# Patient Record
Sex: FEMALE | Race: WHITE | NOT HISPANIC OR LATINO | URBAN - METROPOLITAN AREA
[De-identification: names, ages, dates, MRNs, and addresses within clinical notes are randomized per-mention and may not be internally consistent; named-entity substitution may affect disease eponyms.]

---

## 2024-07-08 NOTE — H&P (VIEW-ONLY)
[facility]-[address] [address]       Name: Hallie Nix  MRN:  [id number]       Date: 2024  :    1935  Age: 89 y.o.         Reason for Visit: Test Results    History of Present Illness  Hallie Nix is a 89 y.o. female who is being seen today for follow up with CT abdomen.  Having pain and burning LUQ.  When she urinates, sometimes a little stool will come out.  Normal brown colored stool.  Pain in abdomen does  not worsen with food intake.  Stomach hurts the most in the evening.  Has been taking the Protonix and it has helped.  BP well controlled.  Taking Celexa for anxiety.    Past Medical History:   Diagnosis Date   • Anxiety    • Asthma    • HTN (hypertension)    • Hyperlipidemia    • Hypothyroid          Past Surgical History:   Procedure Laterality Date   • NO PAST SURGERIES           Current Outpatient Medications   Medication Sig   • albuterol sulfate (PROVENTIL OR VENTOLIN OR PROAIR) 90 mcg/actuation Inhalation oral inhaler Take 1-2 Puffs by inhalation Every 6 hours as needed   • amLODIPine (NORVASC) 5 mg Oral Tablet Take 1 Tablet (5 mg total) by mouth Once a day for 200 days   • atorvastatin (LIPITOR) 20 mg Oral Tablet Take 1 Tablet (20 mg total) by mouth Once a day for 200 days   • BREO ELLIPTA 200-25 mcg/dose Inhalation Disk with Device Take 1 Inhalation by inhalation Once a day for 90 days   • citalopram (CELEXA) 10 mg Oral Tablet Take 1 Tablet (10 mg total) by mouth Once a day for 90 days   • levothyroxine (SYNTHROID) 88 mcg Oral Tablet Take 1 Tablet (88 mcg total) by mouth Every morning for 200 days   • pantoprazole (PROTONIX) 40 mg Oral Tablet, Delayed Release (E.C.) Take 1 Tablet (40 mg total) by mouth Once a day for 30 days     No Known Allergies  Family Medical History:    None         Social History     Tobacco Use   • Smoking status: Never   • Smokeless tobacco: Never   Substance Use Topics   • Alcohol use: No   • Drug use: No        Nursing note  There are no exam notes on file for this visit.     Review of Systems  Review of Systems   Gastrointestinal:  Positive for abdominal pain (LUQ).   All other systems reviewed and are negative.       Physical Exam:  /72   Pulse 74   Temp 36.5 °C (97.7 °F)   Ht 1.524 m (5')   Wt 69.9 kg (154 lb)   SpO2 96%   BMI 30.08 kg/m²       Physical Exam  Vitals reviewed.   Constitutional:       Appearance: Normal appearance.   HENT:      Head: Normocephalic and atraumatic.   Cardiovascular:      Rate and Rhythm: Normal rate and regular rhythm.      Pulses: Normal pulses.      Heart sounds: Normal heart sounds.   Pulmonary:      Effort: Pulmonary effort is normal.      Breath sounds: Normal breath sounds.   Abdominal:      General: Bowel sounds are normal.      Palpations: Abdomen is soft.   Musculoskeletal:         General: Normal range of motion.      Cervical back: Normal range of motion and neck supple.   Skin:     General: Skin is warm and dry.      Capillary Refill: Capillary refill takes 2 to 3 seconds.   Neurological:      Mental Status: She is alert. Mental status is at baseline.          Assessment and Plan      ICD-10-CM    1. Abnormal CT of the abdomen  R93.5       2. Chronic upper abdominal pain  R10.10     G89.29       3. Essential hypertension  I10       4. Anxiety  F41.9              Refill Protonix  Referral to GI    Follow up: Return in about 3 months (around 10/8/2024).    INDIGO Bailey  7/8/2024, 11:55

## 2024-07-09 ENCOUNTER — TELEPHONE (OUTPATIENT)
Dept: GASTROENTEROLOGY | Facility: CLINIC | Age: 89
End: 2024-07-09

## 2024-07-09 NOTE — TELEPHONE ENCOUNTER
CT abdomen done in [place] on 7/5- located in Care Everywhere section of chart. No need for release.     Patient aware.

## 2024-07-09 NOTE — TELEPHONE ENCOUNTER
----- Message from Anu Concepcion MA sent at 7/9/2024  3:15 PM EDT -----  This is a new pt coming to see Bobby on 7/18 her Daughter called and said they needed Mri results faxed to us before hand but that the place it was completed at requested our office reach out to them first. Can I call and request the records? Does Bobby have to? Or does she have to do a medical release form first. It was done at a non  Facility in [facility]. The number is [phone].

## 2024-07-18 ENCOUNTER — APPOINTMENT (OUTPATIENT)
Dept: GASTROENTEROLOGY | Facility: CLINIC | Age: 89
End: 2024-07-18
Payer: MEDICARE

## 2024-07-18 VITALS
HEART RATE: 76 BPM | OXYGEN SATURATION: 94 % | BODY MASS INDEX: 29.38 KG/M2 | SYSTOLIC BLOOD PRESSURE: 171 MMHG | DIASTOLIC BLOOD PRESSURE: 87 MMHG | WEIGHT: 155.6 LBS

## 2024-07-18 DIAGNOSIS — R10.10 PAIN OF UPPER ABDOMEN: ICD-10-CM

## 2024-07-18 DIAGNOSIS — R93.3 ABNORMAL FINDING ON GI TRACT IMAGING: Primary | ICD-10-CM

## 2024-07-18 PROCEDURE — 1036F TOBACCO NON-USER: CPT | Performed by: INTERNAL MEDICINE

## 2024-07-18 PROCEDURE — 99203 OFFICE O/P NEW LOW 30 MIN: CPT | Performed by: INTERNAL MEDICINE

## 2024-07-18 PROCEDURE — 1159F MED LIST DOCD IN RCRD: CPT | Performed by: INTERNAL MEDICINE

## 2024-07-18 RX ORDER — AMLODIPINE BESYLATE 5 MG/1
5 TABLET ORAL
COMMUNITY
Start: 2024-04-18 | End: 2024-11-04

## 2024-07-18 RX ORDER — ALBUTEROL SULFATE 90 UG/1
1-2 AEROSOL, METERED RESPIRATORY (INHALATION)
COMMUNITY

## 2024-07-18 RX ORDER — LEVOTHYROXINE SODIUM 88 UG/1
88 TABLET ORAL
COMMUNITY
Start: 2024-04-18 | End: 2024-11-04

## 2024-07-18 RX ORDER — ATORVASTATIN CALCIUM 20 MG/1
20 TABLET, FILM COATED ORAL
COMMUNITY
Start: 2024-07-02 | End: 2025-01-18

## 2024-07-18 RX ORDER — PANTOPRAZOLE SODIUM 40 MG/1
40 TABLET, DELAYED RELEASE ORAL
COMMUNITY
Start: 2024-07-08 | End: 2024-08-07

## 2024-07-18 RX ORDER — FLUTICASONE FUROATE AND VILANTEROL TRIFENATATE 200; 25 UG/1; UG/1
1 POWDER RESPIRATORY (INHALATION) DAILY
COMMUNITY
Start: 2024-07-01

## 2024-07-18 ASSESSMENT — ENCOUNTER SYMPTOMS: ABDOMINAL PAIN: 1

## 2024-07-18 NOTE — PROGRESS NOTES
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  HPI  89-year-old female with history of hypertension, hyperlipidemia, hypothyroidism, anxiety, asthma came to GI clinic as a referral for further evaluation due to abnormal findings on CT scan.  She report upper abdominal pain over the 3 to 4 months-intermittent, mild in severity, no particular aggravating/elevating factor.  She lost lately 4 pounds intentionally.  She had episode of constipation which subsequently resolved   Denies nausea vomiting heartburn diarrhea melena/hematochezia  No previous EGD.  No family history of GI cancer.  Labs shows mild impairment of renal function probably due to dehydration checked on 7/5/2024.  Borderline total bilirubin.  Normal hemoglobin  CT scan on 7/5/2024 at [facility] -Abnormal wall thickening involving the upper stomach.  Small hiatal hernia   Review of Systems   Gastrointestinal:  Positive for abdominal pain.     12 Point ROS negative outside of symptoms stated above in HPI    History reviewed. No pertinent past medical history.    History reviewed. No pertinent surgical history.    No relevant family history has been documented for this patient.     reports that she has never smoked. She has never used smokeless tobacco. She reports that she does not drink alcohol and does not use drugs.    No Known Allergies    Imaging  CT abdomen w IV contrast    Result Date: 7/6/2024  PASTOR NIX RADIOLOGIST: Bereket Quiles MD CT ABDOMEN W IV CONTRAST performed on 7/5/2024 5:53 PM CLINICAL HISTORY: R10.10: Chronic upper abdominal pain G89.29: Chronic upper abdominal pain. Chronic upper abdominal pain, worst on left with nausea TECHNIQUE:  Abdomen CT with intravenous contrast. IV CONTRAST: 75 ml of Isovue 300 COMPARISON:  None. FINDINGS: Lung bases: Mild dependent atelectasis Liver:   4 small benign-appearing hypodensities likely cysts  Biliary:   Gallbladder is unremarkable No bile duct dilation Spleen:   Unremarkable. Pancreas:   Unremarkable. Adrenals:   Unremarkable. Kidneys:   No acute findings Mild renal parenchymal volume loss <1 cm simple cyst left kidney No obstruction No stones Bowel:   Small hiatal hernia Asymmetric wall thickening upper stomach No gastric outlet obstruction Imaged loops of small and large bowel are unremarkable Lymph nodes:  No adenopathy Vasculature:   Unremarkable Peritoneum / Retroperitoneum: Unremarkable Bones:   No acute findings Moderately advanced degenerative changes lumbar spine    Apparent abnormal wall thickening involving the upper stomach. This is potentially malignant. Recommend GI consultation and follow-up endoscopy if not performed in the recent past Small hiatal hernia One or more dose reduction techniques were used (e.g., Automated exposure control, adjustment of the mA and/or kV according to patient size, use of iterative reconstruction technique). A Non Emergent Yellow actionable finding has been sent via the [employer] Findings application on 7/6/2024 7:42 AM, Message ID [id number]. Receipt of this communication will be communicated to [place] [facility] or responsible provider and will be documented in [employer] System upon receiving the acknowledgement. Radiologist location ID: [id number]        Laboratory  No results found for this or any previous visit (from the past 96 hour(s)).     Objective       Current Outpatient Medications:     albuterol 90 mcg/actuation inhaler, Inhale 1-2 puffs., Disp: , Rfl:     amLODIPine (Norvasc) 5 mg tablet, Take 1 tablet (5 mg) by mouth., Disp: , Rfl:     atorvastatin (Lipitor) 20 mg tablet, Take 1 tablet (20 mg) by mouth., Disp: , Rfl:     Breo Ellipta 200-25 mcg/dose inhaler, Take 1 puff by mouth once daily., Disp: , Rfl:     levothyroxine (Synthroid, Levoxyl) 88 mcg tablet, Take 1 tablet (88 mcg) by mouth., Disp: , Rfl:      pantoprazole (ProtoNix) 40 mg EC tablet, Take 1 tablet (40 mg) by mouth., Disp: , Rfl:     Last Recorded Vitals  Blood pressure 171/87, pulse 76, weight 70.6 kg (155 lb 9.6 oz), SpO2 94%.    Physical Exam  HENT:      Mouth/Throat:      Mouth: Mucous membranes are moist.   Eyes:      General: No scleral icterus.  Cardiovascular:      Rate and Rhythm: Normal rate and regular rhythm.   Pulmonary:      Effort: Pulmonary effort is normal. No respiratory distress.      Breath sounds: Normal breath sounds.   Abdominal:      General: Abdomen is flat. Bowel sounds are normal.      Palpations: Abdomen is soft.   Musculoskeletal:      Right lower leg: Edema present.      Left lower leg: Edema present.      Comments: Trace   Neurological:      Mental Status: She is alert and oriented to person, place, and time.         Assessment/Plan    Upper abdominal pain  Abnormal findings on CT scan-rule out gastric malignancy      EGD with biopsy as soon as possible.  Continue follow-up with PMD  Follow-up with GI as needed

## 2024-07-21 ENCOUNTER — ANESTHESIA EVENT (OUTPATIENT)
Dept: OPERATING ROOM | Facility: HOSPITAL | Age: 89
End: 2024-07-21
Payer: MEDICARE

## 2024-07-21 RX ORDER — ONDANSETRON HYDROCHLORIDE 2 MG/ML
8 INJECTION, SOLUTION INTRAVENOUS ONCE
Status: CANCELLED | OUTPATIENT
Start: 2024-07-21 | End: 2024-07-21

## 2024-07-21 RX ORDER — ACETAMINOPHEN 325 MG/1
650 TABLET ORAL EVERY 4 HOURS PRN
Status: CANCELLED | OUTPATIENT
Start: 2024-07-21

## 2024-07-21 RX ORDER — ALBUTEROL SULFATE 0.83 MG/ML
2.5 SOLUTION RESPIRATORY (INHALATION) ONCE AS NEEDED
Status: CANCELLED | OUTPATIENT
Start: 2024-07-21

## 2024-07-22 ENCOUNTER — ANESTHESIA (OUTPATIENT)
Dept: OPERATING ROOM | Facility: HOSPITAL | Age: 89
End: 2024-07-22
Payer: MEDICARE

## 2024-07-22 ENCOUNTER — HOSPITAL ENCOUNTER (OUTPATIENT)
Dept: OPERATING ROOM | Facility: HOSPITAL | Age: 89
Setting detail: OUTPATIENT SURGERY
Discharge: HOME | End: 2024-07-22
Payer: MEDICARE

## 2024-07-22 VITALS
HEART RATE: 80 BPM | RESPIRATION RATE: 16 BRPM | TEMPERATURE: 96.8 F | OXYGEN SATURATION: 98 % | BODY MASS INDEX: 30.6 KG/M2 | SYSTOLIC BLOOD PRESSURE: 155 MMHG | WEIGHT: 155.87 LBS | DIASTOLIC BLOOD PRESSURE: 70 MMHG | HEIGHT: 60 IN

## 2024-07-22 DIAGNOSIS — R93.3 ABNORMAL FINDING ON GI TRACT IMAGING: Primary | ICD-10-CM

## 2024-07-22 DIAGNOSIS — I85.00 VARICES OF ESOPHAGUS DETERMINED BY ENDOSCOPY (MULTI): ICD-10-CM

## 2024-07-22 DIAGNOSIS — R93.3 ABNORMAL FINDING ON GI TRACT IMAGING: ICD-10-CM

## 2024-07-22 PROCEDURE — 7100000009 HC PHASE TWO TIME - INITIAL BASE CHARGE: Performed by: ANESTHESIOLOGY

## 2024-07-22 PROCEDURE — 7100000010 HC PHASE TWO TIME - EACH INCREMENTAL 1 MINUTE: Performed by: ANESTHESIOLOGY

## 2024-07-22 PROCEDURE — 2500000005 HC RX 250 GENERAL PHARMACY W/O HCPCS: Performed by: NURSE ANESTHETIST, CERTIFIED REGISTERED

## 2024-07-22 PROCEDURE — 3700000001 HC GENERAL ANESTHESIA TIME - INITIAL BASE CHARGE: Performed by: ANESTHESIOLOGY

## 2024-07-22 PROCEDURE — 3600000007 HC OR TIME - EACH INCREMENTAL 1 MINUTE - PROCEDURE LEVEL TWO: Performed by: ANESTHESIOLOGY

## 2024-07-22 PROCEDURE — 3700000002 HC GENERAL ANESTHESIA TIME - EACH INCREMENTAL 1 MINUTE: Performed by: ANESTHESIOLOGY

## 2024-07-22 PROCEDURE — A43235 PR ESOPHAGOGASTRODUODENOSCOPY TRANSORAL DIAGNOSTIC: Performed by: ANESTHESIOLOGY

## 2024-07-22 PROCEDURE — 99100 ANES PT EXTEME AGE<1 YR&>70: CPT | Performed by: ANESTHESIOLOGY

## 2024-07-22 PROCEDURE — 3600000002 HC OR TIME - INITIAL BASE CHARGE - PROCEDURE LEVEL TWO: Performed by: ANESTHESIOLOGY

## 2024-07-22 PROCEDURE — 2500000004 HC RX 250 GENERAL PHARMACY W/ HCPCS (ALT 636 FOR OP/ED): Performed by: ANESTHESIOLOGY

## 2024-07-22 PROCEDURE — A43235 PR ESOPHAGOGASTRODUODENOSCOPY TRANSORAL DIAGNOSTIC: Performed by: NURSE ANESTHETIST, CERTIFIED REGISTERED

## 2024-07-22 PROCEDURE — 2500000004 HC RX 250 GENERAL PHARMACY W/ HCPCS (ALT 636 FOR OP/ED): Performed by: NURSE ANESTHETIST, CERTIFIED REGISTERED

## 2024-07-22 PROCEDURE — 43239 EGD BIOPSY SINGLE/MULTIPLE: CPT | Performed by: INTERNAL MEDICINE

## 2024-07-22 RX ORDER — SODIUM CHLORIDE, SODIUM LACTATE, POTASSIUM CHLORIDE, CALCIUM CHLORIDE 600; 310; 30; 20 MG/100ML; MG/100ML; MG/100ML; MG/100ML
100 INJECTION, SOLUTION INTRAVENOUS CONTINUOUS
Status: DISCONTINUED | OUTPATIENT
Start: 2024-07-22 | End: 2024-07-23 | Stop reason: HOSPADM

## 2024-07-22 RX ORDER — LIDOCAINE HCL/PF 100 MG/5ML
SYRINGE (ML) INTRAVENOUS AS NEEDED
Status: DISCONTINUED | OUTPATIENT
Start: 2024-07-22 | End: 2024-07-22

## 2024-07-22 RX ORDER — PROPOFOL 10 MG/ML
INJECTION, EMULSION INTRAVENOUS AS NEEDED
Status: DISCONTINUED | OUTPATIENT
Start: 2024-07-22 | End: 2024-07-22

## 2024-07-22 RX ORDER — METOPROLOL TARTRATE 1 MG/ML
INJECTION, SOLUTION INTRAVENOUS AS NEEDED
Status: DISCONTINUED | OUTPATIENT
Start: 2024-07-22 | End: 2024-07-22

## 2024-07-22 ASSESSMENT — PAIN SCALES - GENERAL
PAINLEVEL_OUTOF10: 0 - NO PAIN
PAIN_LEVEL: 0
PAINLEVEL_OUTOF10: 0 - NO PAIN
PAINLEVEL_OUTOF10: 0 - NO PAIN

## 2024-07-22 ASSESSMENT — COLUMBIA-SUICIDE SEVERITY RATING SCALE - C-SSRS
2. HAVE YOU ACTUALLY HAD ANY THOUGHTS OF KILLING YOURSELF?: NO
1. IN THE PAST MONTH, HAVE YOU WISHED YOU WERE DEAD OR WISHED YOU COULD GO TO SLEEP AND NOT WAKE UP?: NO
6. HAVE YOU EVER DONE ANYTHING, STARTED TO DO ANYTHING, OR PREPARED TO DO ANYTHING TO END YOUR LIFE?: NO

## 2024-07-22 ASSESSMENT — PAIN - FUNCTIONAL ASSESSMENT
PAIN_FUNCTIONAL_ASSESSMENT: 0-10
PAIN_FUNCTIONAL_ASSESSMENT: 0-10

## 2024-07-22 NOTE — ANESTHESIA PREPROCEDURE EVALUATION
"Patient: Hallie Nix    Procedure Information       Date/Time: 07/22/24 1040    Scheduled providers: Antonio Rosales MD; Arturo Akhtar RN; Karley Abarca RN; Zaire Stewart MD    Procedure: EGD    Location: [facility] OR     89-year-old female with history of hypertension, hyperlipidemia, hypothyroidism, anxiety, asthma came to GI clinic as a referral for further evaluation due to abnormal findings on CT scan.     ALLERGIES:  No Known Allergies     MEDICAL HISTORY:  Past Medical History:   Diagnosis Date    Abdominal pain     Abnormal finding on GI tract imaging 07/05/2024    Anxiety     Asthma ([facility])     HLD (hyperlipidemia)     HTN (hypertension)     Hypothyroid     Major depressive disorder, single episode, unspecified 05/30/2021        Relevant Problems   No relevant active problems        SURGICAL HISTORY:  History reviewed. No pertinent surgical history.     MEDS:  Current Outpatient Medications   Medication Instructions    albuterol 90 mcg/actuation inhaler 1-2 puffs, inhalation    amLODIPine (NORVASC) 5 mg, oral    atorvastatin (LIPITOR) 20 mg, oral    Breo Ellipta 200-25 mcg/dose inhaler 1 puff, oral, Daily    levothyroxine (SYNTHROID, LEVOXYL) 88 mcg, oral    pantoprazole (PROTONIX) 40 mg, oral        VITALS:      7/22/2024     9:34 AM 7/18/2024    11:26 AM 5/27/2022     2:12 PM   Vitals   Systolic 159 171 168   Diastolic 61 87 51   Heart Rate 79 76 94   Temp 36.4 °C (97.5 °F)  36.1 °C (97 °F)   Resp 16     Height (in) 1.524 m (5')  1.55 m (5' 1.02\")   Weight (lb) 155.87 155.6    BMI 30.44 kg/m2 29.38 kg/m2    BSA (m2) 1.73 m2 1.74 m2    Visit Report  Report        LABS:   BMP No results found for: \"GLUCOSE\", \"CALCIUM\", \"NA\", \"K\", \"CO2\", \"CL\", \"BUN\", \"CREATININE\", LFT No results found for: \"ALT\", \"AST\", \"GGT\", \"ALKPHOS\", \"BILITOT\", CBCNo results found for: \"WBC\", \"HGB\", \"HCT\", \"MCV\", \"PLT\"       , Coags No results found for: \"PROTIME\", \"INR\", \"APTT\"   , A1C No results found " "for: \"HGBA1C\", ABG       IMAGES:  EKG        No results found for this or any previous visit (from the past 4464 hour(s)).   , ECHO       No results found for this or any previous visit from the past 730 days.    , CARDIAC CATH      [unfilled], CXR     No results found for this or any previous visit from the past 730 days.    , CT Head/Neck    [unfilled], CT Chest      No results found for this or any previous visit from the past 730 days.   , CT Abdomin       CT abdomen w IV contrast 07/05/2024    Melinda MACKAY    RADIOLOGIST: Bereket Quiles MD    CT ABDOMEN W IV CONTRAST performed on 7/5/2024 5:53 PM    CLINICAL HISTORY: R10.10: Chronic upper abdominal pain  G89.29: Chronic upper abdominal pain.  Chronic upper abdominal pain, worst on left with nausea    TECHNIQUE:  Abdomen CT with intravenous contrast.  IV CONTRAST: 75 ml of Isovue 300    COMPARISON:  None.    FINDINGS:  Lung bases: Mild dependent atelectasis    Liver:   4 small benign-appearing hypodensities likely cysts    Biliary:  Gallbladder is unremarkable  No bile duct dilation    Spleen:   Unremarkable.    Pancreas:   Unremarkable.    Adrenals:   Unremarkable.    Kidneys:   No acute findings  Mild renal parenchymal volume loss  <1 cm simple cyst left kidney  No obstruction  No stones    Bowel:  Small hiatal hernia  Asymmetric wall thickening upper stomach  No gastric outlet obstruction  Imaged loops of small and large bowel are unremarkable    Lymph nodes:  No adenopathy    Vasculature:   Unremarkable    Peritoneum / Retroperitoneum: Unremarkable    Bones:   No acute findings  Moderately advanced degenerative changes lumbar spine    Impression  Apparent abnormal wall thickening involving the upper stomach. This is potentially malignant. Recommend GI consultation and follow-up endoscopy if not performed in the recent past    Small hiatal hernia          One or more dose reduction techniques were used (e.g., Automated exposure control, " adjustment of the mA and/or kV according to patient size, use of iterative reconstruction technique).    A Non Emergent Yellow actionable finding has been sent via the [employer] Findings application on 7/6/2024 7:42 AM, Message ID [id number]. Receipt of this communication will be communicated to [place] [facility] or responsible provider and will be documented in [employer] Findings System upon receiving the acknowledgement.      Radiologist location ID: [id number]    SOCIAL:  Social History     Tobacco Use   Smoking Status Never   Smokeless Tobacco Never      Social History     Substance and Sexual Activity   Alcohol Use Never      Social History     Substance and Sexual Activity   Drug Use Never        NPO STATUS:  NPO/Void Status  Date of Last Liquid: 07/21/24  Date of Last Solid: 07/21/24        Clinical Areas Reviewed:   Tobacco  Allergies  Meds   Med Hx  Surg Hx   Fam Hx  Soc Hx      Physical Exam    Airway  Mallampati: II  TM distance: >3 FB  Neck ROM: full     Cardiovascular - normal exam     Dental    Pulmonary - normal exam     Abdominal - normal exam             Anesthesia Plan    History of general anesthesia?: yes  History of complications of general anesthesia?: no    ASA 2     MAC     intravenous induction   Postoperative administration of opioids is intended.  Anesthetic plan and risks discussed with patient.  Use of blood products discussed with patient who.    Plan discussed with CRNA.

## 2024-07-22 NOTE — DISCHARGE INSTRUCTIONS

## 2024-07-22 NOTE — ANESTHESIA POSTPROCEDURE EVALUATION
Patient: Hallie Nix    Procedure Summary       Date: 07/22/24 Room / Location: [facility] OR    Anesthesia Start: 1114 Anesthesia Stop: 1135    Procedure: EGD Diagnosis: Abnormal finding on GI tract imaging    Scheduled Providers: Antonio Rosales MD; Arturo Akhtar, SALEEM; Karley Abarca RN; Zaire Stewart MD Responsible Provider: Zaire Stewart MD    Anesthesia Type: MAC ASA Status: 2            Anesthesia Type: MAC    Vitals Value Taken Time   /80 07/22/24 1130   Temp 36 °C (96.8 °F) 07/22/24 1130   Pulse 78 07/22/24 1130   Resp 15 07/22/24 1130   SpO2 97 % 07/22/24 1130       Anesthesia Post Evaluation    Patient location during evaluation: PACU  Patient participation: complete - patient participated  Level of consciousness: awake and alert  Pain score: 0  Pain management: adequate  Multimodal analgesia pain management approach  Airway patency: patent  Cardiovascular status: acceptable  Respiratory status: acceptable  Hydration status: acceptable  Postoperative Nausea and Vomiting: none        No notable events documented.

## 2024-07-31 ENCOUNTER — HOSPITAL ENCOUNTER (OUTPATIENT)
Dept: RADIOLOGY | Facility: CLINIC | Age: 89
Discharge: HOME | End: 2024-07-31
Payer: MEDICARE

## 2024-07-31 DIAGNOSIS — R93.3 ABNORMAL FINDING ON GI TRACT IMAGING: ICD-10-CM

## 2024-07-31 DIAGNOSIS — I85.00 VARICES OF ESOPHAGUS DETERMINED BY ENDOSCOPY (MULTI): ICD-10-CM

## 2024-07-31 LAB
LABORATORY COMMENT REPORT: NORMAL
PATH REPORT.FINAL DX SPEC: NORMAL
PATH REPORT.GROSS SPEC: NORMAL
PATH REPORT.RELEVANT HX SPEC: NORMAL
PATH REPORT.TOTAL CANCER: NORMAL

## 2024-07-31 PROCEDURE — 2550000001 HC RX 255 CONTRASTS: Performed by: INTERNAL MEDICINE

## 2024-07-31 PROCEDURE — 71260 CT THORAX DX C+: CPT | Performed by: STUDENT IN AN ORGANIZED HEALTH CARE EDUCATION/TRAINING PROGRAM

## 2024-07-31 PROCEDURE — 71260 CT THORAX DX C+: CPT

## 2024-09-12 NOTE — H&P (VIEW-ONLY)
[facility]-[address] [address]       Name: Hallie Nix  MRN:  [id number]       Date: 2024  :    1935  Age: 89 y.o.         Reason for Visit: Poison Ivy and Medication Check    History of Present Illness  Hallie Nix is a 89 y.o. female who is being seen today for poison ivy rash on both arms, leg and face.  It started a couple of days ago.  She has used Benadryl cream with limited relief.  She would like to get a steroid injection.  She denies any shortness of breath.  She also needs a refill on Celexa for anxiety.  It is working well for her.    Past Medical History:   Diagnosis Date   • Anxiety    • Asthma    • HTN (hypertension)    • Hyperlipidemia    • Hypothyroid          Past Surgical History:   Procedure Laterality Date   • NO PAST SURGERIES           Current Outpatient Medications   Medication Sig   • albuterol sulfate (PROVENTIL OR VENTOLIN OR PROAIR) 90 mcg/actuation Inhalation oral inhaler Take 1-2 Puffs by inhalation Every 6 hours as needed   • amLODIPine (NORVASC) 5 mg Oral Tablet Take 1 Tablet (5 mg total) by mouth Once a day for 200 days   • atorvastatin (LIPITOR) 20 mg Oral Tablet Take 1 Tablet (20 mg total) by mouth Once a day for 200 days   • citalopram (CELEXA) 10 mg Oral Tablet Take 1 Tablet (10 mg total) by mouth Once a day for 180 days   • levothyroxine (SYNTHROID) 88 mcg Oral Tablet Take 1 Tablet (88 mcg total) by mouth Every morning for 200 days   • pantoprazole (PROTONIX) 40 mg Oral Tablet, Delayed Release (E.C.) Take 1 Tablet (40 mg total) by mouth Once a day for 90 days     No Known Allergies  Family Medical History:    None         Social History     Tobacco Use   • Smoking status: Never   • Smokeless tobacco: Never   Substance Use Topics   • Alcohol use: No   • Drug use: No       Nursing note  There are no exam notes on file for this visit.     Review of Systems  ROS     Physical Exam:  /80   Pulse 83   Temp 36.3 °C  (97.3 °F)   Ht 1.524 m (5')   Wt 69.1 kg (152 lb 6.4 oz)   SpO2 94%   BMI 29.76 kg/m²       Physical Exam  Vitals reviewed.   Constitutional:       General: She is not in acute distress.  HENT:      Head: Normocephalic.   Cardiovascular:      Rate and Rhythm: Normal rate and regular rhythm.      Heart sounds: Normal heart sounds.   Pulmonary:      Effort: Pulmonary effort is normal.      Breath sounds: Normal breath sounds.   Skin:     Comments: Erythematous vesicular rash on both forearms, right ankle and right cheek.   Neurological:      Mental Status: She is alert and oriented to person, place, and time.      Cranial Nerves: No cranial nerve deficit.          Assessment and Plan      ICD-10-CM    1. Poison ivy dermatitis  L23.7       2. Anxiety  F41.9 citalopram (CELEXA) 10 mg Oral Tablet      3. Body mass index (BMI) 29.0-29.9, adult  Z68.29              Orders Placed This Encounter   • citalopram (CELEXA) 10 mg Oral Tablet     Sig: Take 1 Tablet (10 mg total) by mouth Once a day for 180 days     Dispense:  90 Tablet     Refill:  1        Follow up: Return if symptoms worsen or fail to improve.    Janna Howe PA-C  9/12/2024, 13:10

## 2024-10-02 ENCOUNTER — ANESTHESIA EVENT (OUTPATIENT)
Dept: GASTROENTEROLOGY | Facility: HOSPITAL | Age: 89
End: 2024-10-02
Payer: MEDICARE

## 2024-10-02 NOTE — ANESTHESIA PREPROCEDURE EVALUATION
"Patient: Hallie Nix    Procedure Information       Date/Time: 10/03/24 1000    Scheduled providers: Mikey Page DO; Praneeth Brennan MD    Procedure: ENDOSCOPIC ULTRASOUND (UPPER)    Location: [facility]            Relevant Problems   No relevant active problems       Clinical information reviewed:   Tobacco  Allergies  Meds   Med Hx  Surg Hx  OB Status  Fam Hx  Soc   Hx         Past Medical History:   Diagnosis Date    Abnormal finding on GI tract imaging 07/05/2024    Anxiety     Asthma     HLD (hyperlipidemia)     HTN (hypertension)     Hypothyroid     Major depressive disorder, single episode, unspecified 05/30/2021      Past Surgical History:   Procedure Laterality Date    ESOPHAGOGASTRODUODENOSCOPY       Social History     Tobacco Use    Smoking status: Never    Smokeless tobacco: Never   Vaping Use    Vaping status: Never Used   Substance Use Topics    Alcohol use: Never    Drug use: Never      Current Outpatient Medications   Medication Instructions    albuterol 90 mcg/actuation inhaler 1-2 puffs, inhalation    amLODIPine (NORVASC) 5 mg, oral    atorvastatin (LIPITOR) 20 mg, oral    Breo Ellipta 200-25 mcg/dose inhaler 1 puff, oral, Daily    levothyroxine (SYNTHROID, LEVOXYL) 88 mcg, oral    pantoprazole (PROTONIX) 40 mg, oral      Allergies   Allergen Reactions    Poison Samina Rash        Chemistry    No results found for: \"NA\", \"K\", \"CL\", \"CO2\", \"BUN\", \"CREATININE\", \"GLU\" No results found for: \"CALCIUM\", \"ALKPHOS\", \"AST\", \"ALT\", \"BILITOT\"       No results found for: \"HGBA1C\"  No results found for: \"WBC\", \"HGB\", \"HCT\", \"PLT\"  No results found for: \"PROTIME\", \"PTT\", \"INR\"  No results found for: \"ABORH\"  No results found for this or any previous visit (from the past 4464 hour(s)).  No results found for this or any previous visit from the past 1095 days.       Visit Vitals  BP (!) 134/97   Pulse 84   Temp 36.8 °C (98.2 °F) (Temporal)   Resp 12   Ht 1.524 m (5')   Wt 69.8 kg (153 lb 14.1 " oz)   SpO2 98%   BMI 30.05 kg/m²   OB Status Postmenopausal   Smoking Status Never   BSA 1.72 m²     NPO/Void Status  Carbohydrate Drink Given Prior to Surgery? : N  Date of Last Liquid: 10/03/24  Time of Last Liquid: 0500  Date of Last Solid: 10/02/24  Time of Last Solid: 1900  Last Intake Type: Clear fluids  Time of Last Void: 0921        Physical Exam    Airway  Mallampati: III  TM distance: >3 FB  Neck ROM: full     Cardiovascular - normal exam  Rhythm: regular  Rate: normal     Dental    Pulmonary - normal exam     Abdominal - normal exam             Anesthesia Plan    History of general anesthesia?: yes  History of complications of general anesthesia?: no    ASA 3     MAC   (With standard ASA monitoring.)  intravenous induction   Anesthetic plan and risks discussed with patient.    Plan discussed with CRNA and CAA.

## 2024-10-03 ENCOUNTER — HOSPITAL ENCOUNTER (OUTPATIENT)
Dept: GASTROENTEROLOGY | Facility: HOSPITAL | Age: 89
Discharge: HOME | End: 2024-10-03
Payer: MEDICARE

## 2024-10-03 ENCOUNTER — ANESTHESIA (OUTPATIENT)
Dept: GASTROENTEROLOGY | Facility: HOSPITAL | Age: 89
End: 2024-10-03
Payer: MEDICARE

## 2024-10-03 VITALS
SYSTOLIC BLOOD PRESSURE: 144 MMHG | RESPIRATION RATE: 18 BRPM | TEMPERATURE: 97.9 F | HEART RATE: 72 BPM | BODY MASS INDEX: 30.21 KG/M2 | HEIGHT: 60 IN | DIASTOLIC BLOOD PRESSURE: 58 MMHG | WEIGHT: 153.88 LBS | OXYGEN SATURATION: 96 %

## 2024-10-03 DIAGNOSIS — R10.13 DYSPEPSIA: Primary | ICD-10-CM

## 2024-10-03 DIAGNOSIS — R10.12 ABDOMINAL PAIN, LEFT UPPER QUADRANT: Primary | ICD-10-CM

## 2024-10-03 DIAGNOSIS — R93.3 ABNORMAL FINDING ON GI TRACT IMAGING: ICD-10-CM

## 2024-10-03 PROCEDURE — 43259 EGD US EXAM DUODENUM/JEJUNUM: CPT | Performed by: INTERNAL MEDICINE

## 2024-10-03 PROCEDURE — 7100000009 HC PHASE TWO TIME - INITIAL BASE CHARGE

## 2024-10-03 PROCEDURE — 2500000004 HC RX 250 GENERAL PHARMACY W/ HCPCS (ALT 636 FOR OP/ED): Performed by: INTERNAL MEDICINE

## 2024-10-03 PROCEDURE — 2500000004 HC RX 250 GENERAL PHARMACY W/ HCPCS (ALT 636 FOR OP/ED)

## 2024-10-03 PROCEDURE — 3700000002 HC GENERAL ANESTHESIA TIME - EACH INCREMENTAL 1 MINUTE

## 2024-10-03 PROCEDURE — 3700000001 HC GENERAL ANESTHESIA TIME - INITIAL BASE CHARGE

## 2024-10-03 PROCEDURE — 2720000007 HC OR 272 NO HCPCS

## 2024-10-03 PROCEDURE — 7100000010 HC PHASE TWO TIME - EACH INCREMENTAL 1 MINUTE

## 2024-10-03 RX ORDER — PANTOPRAZOLE SODIUM 20 MG/1
20 TABLET, DELAYED RELEASE ORAL DAILY
Qty: 30 TABLET | Refills: 11 | Status: SHIPPED | OUTPATIENT
Start: 2024-10-03 | End: 2025-10-03

## 2024-10-03 RX ORDER — SODIUM CHLORIDE, SODIUM LACTATE, POTASSIUM CHLORIDE, CALCIUM CHLORIDE 600; 310; 30; 20 MG/100ML; MG/100ML; MG/100ML; MG/100ML
20 INJECTION, SOLUTION INTRAVENOUS CONTINUOUS
Status: DISCONTINUED | OUTPATIENT
Start: 2024-10-03 | End: 2024-10-04 | Stop reason: HOSPADM

## 2024-10-03 RX ORDER — PROPOFOL 10 MG/ML
INJECTION, EMULSION INTRAVENOUS AS NEEDED
Status: DISCONTINUED | OUTPATIENT
Start: 2024-10-03 | End: 2024-10-03

## 2024-10-03 RX ORDER — LIDOCAINE HYDROCHLORIDE 20 MG/ML
INJECTION, SOLUTION EPIDURAL; INFILTRATION; INTRACAUDAL; PERINEURAL AS NEEDED
Status: DISCONTINUED | OUTPATIENT
Start: 2024-10-03 | End: 2024-10-03

## 2024-10-03 ASSESSMENT — PAIN - FUNCTIONAL ASSESSMENT
PAIN_FUNCTIONAL_ASSESSMENT: UNABLE TO SELF-REPORT
PAIN_FUNCTIONAL_ASSESSMENT: 0-10

## 2024-10-03 ASSESSMENT — PAIN SCALES - GENERAL
PAINLEVEL_OUTOF10: 0 - NO PAIN

## 2024-10-03 ASSESSMENT — COLUMBIA-SUICIDE SEVERITY RATING SCALE - C-SSRS
6. HAVE YOU EVER DONE ANYTHING, STARTED TO DO ANYTHING, OR PREPARED TO DO ANYTHING TO END YOUR LIFE?: NO
1. IN THE PAST MONTH, HAVE YOU WISHED YOU WERE DEAD OR WISHED YOU COULD GO TO SLEEP AND NOT WAKE UP?: NO
2. HAVE YOU ACTUALLY HAD ANY THOUGHTS OF KILLING YOURSELF?: NO

## 2024-10-03 NOTE — DISCHARGE INSTRUCTIONS

## 2024-10-03 NOTE — ANESTHESIA POSTPROCEDURE EVALUATION
Patient: Hallie Nix    Procedure Summary       Date: 10/03/24 Room / Location: [facility] [place]    Anesthesia Start: 1011 Anesthesia Stop: 1027    Procedure: ENDOSCOPIC ULTRASOUND (UPPER) Diagnosis:       Abnormal finding on GI tract imaging      Abdominal pain, left upper quadrant    Scheduled Providers: Mikey Page DO; Praneeth Brennan MD; BRINDA Mane; Conchis Parkinson, SALEEM; Arturo Akhtar RN; Ria Manning MA Responsible Provider: Praneeth Brennan MD    Anesthesia Type: MAC ASA Status: 3            Anesthesia Type: MAC    Vitals Value Taken Time   /58 10/03/24 1054   Temp 36.6 °C (97.9 °F) 10/03/24 1028   Pulse 72 10/03/24 1054   Resp 18 10/03/24 1054   SpO2 96 % 10/03/24 1054       Anesthesia Post Evaluation    Patient location during evaluation: PACU  Patient participation: complete - patient participated  Level of consciousness: awake and alert  Pain management: adequate  Airway patency: patent  Cardiovascular status: acceptable and hemodynamically stable  Respiratory status: acceptable, spontaneous ventilation and nonlabored ventilation  Hydration status: acceptable  Postoperative Nausea and Vomiting: none        There were no known notable events for this encounter.